# Patient Record
Sex: MALE | Race: WHITE | NOT HISPANIC OR LATINO | Employment: OTHER | URBAN - METROPOLITAN AREA
[De-identification: names, ages, dates, MRNs, and addresses within clinical notes are randomized per-mention and may not be internally consistent; named-entity substitution may affect disease eponyms.]

---

## 2020-09-25 ENCOUNTER — TRANSCRIBE ORDERS (OUTPATIENT)
Dept: SCHEDULING | Facility: REHABILITATION | Age: 60
End: 2020-09-25

## 2020-09-25 DIAGNOSIS — G12.21 ALS (AMYOTROPHIC LATERAL SCLEROSIS) (CMS/HCC): Primary | ICD-10-CM

## 2020-11-10 ENCOUNTER — HOSPITAL ENCOUNTER (OUTPATIENT)
Dept: PHYSICAL THERAPY | Facility: REHABILITATION | Age: 60
Setting detail: THERAPIES SERIES
Discharge: HOME | End: 2020-11-10
Attending: PSYCHIATRY & NEUROLOGY
Payer: MEDICARE

## 2020-11-10 DIAGNOSIS — G12.21 ALS (AMYOTROPHIC LATERAL SCLEROSIS) (CMS/HCC): Primary | ICD-10-CM

## 2020-11-10 PROCEDURE — 97542 WHEELCHAIR MNGMENT TRAINING: CPT | Mod: GP

## 2020-11-10 PROCEDURE — 97163 PT EVAL HIGH COMPLEX 45 MIN: CPT

## 2020-11-10 RX ORDER — BACLOFEN 10 MG/1
20 TABLET ORAL 2 TIMES DAILY
COMMUNITY
Start: 2020-10-31

## 2020-11-10 RX ORDER — FINASTERIDE 5 MG/1
5 TABLET, FILM COATED ORAL
COMMUNITY
Start: 2020-08-28

## 2020-11-10 RX ORDER — RILUZOLE 50 MG/1
50 TABLET, FILM COATED ORAL
COMMUNITY
Start: 2020-09-22

## 2020-11-10 RX ORDER — ROSUVASTATIN CALCIUM 10 MG/1
10 TABLET, COATED ORAL DAILY
COMMUNITY

## 2020-11-10 RX ORDER — NAPROXEN SODIUM 220 MG/1
81 TABLET, FILM COATED ORAL DAILY
COMMUNITY

## 2020-11-10 RX ORDER — PHENYTOIN SODIUM 100 MG/1
200 CAPSULE, EXTENDED RELEASE ORAL NIGHTLY
COMMUNITY
Start: 2020-09-24

## 2020-11-10 RX ORDER — IBUPROFEN 800 MG/1
TABLET ORAL
COMMUNITY
Start: 2020-11-09

## 2020-11-10 RX ORDER — LORAZEPAM 0.5 MG/1
0.5 TABLET ORAL EVERY 6 HOURS PRN
COMMUNITY
Start: 2020-09-13

## 2020-11-10 RX ORDER — CARVEDILOL 12.5 MG/1
12.5 TABLET ORAL
COMMUNITY
Start: 2020-09-10

## 2020-11-10 RX ORDER — CYCLOBENZAPRINE HCL 10 MG
TABLET ORAL NIGHTLY
COMMUNITY
Start: 2020-10-31

## 2020-11-10 RX ORDER — TICAGRELOR 90 MG/1
90 TABLET ORAL
COMMUNITY
Start: 2020-08-31

## 2020-11-10 RX ORDER — TRAMADOL HYDROCHLORIDE 50 MG/1
50 TABLET ORAL EVERY 6 HOURS PRN
COMMUNITY
Start: 2020-08-11

## 2020-11-10 RX ORDER — METFORMIN HYDROCHLORIDE 500 MG/1
TABLET, EXTENDED RELEASE ORAL
COMMUNITY
Start: 2020-10-31

## 2020-11-10 NOTE — Clinical Note
"[address] [address]  [phone]      Dear DR. Woods,      Thank you for this referral. Please review the attached notes and plan of care for your approval.  Please contact our department with any questions.     Sincerely,     Ariana Mchugh, PT      Referring Provider: By co-signing this Plan of Care (POC), you agree with the planned services and interventions recommended by the therapist.       NAME: __________________________________ DATE: ___________________        [employer] Fax: [phone]      [facility]   PT/OT EVALUATION FOR MOBILITY-ASSISTIVE DEVICE    Medicare Provider No. : [id number]                               Patient: Manuel Vásquez      MRN: [id number]  : 1960      Referring Physician: Ishmael Woods*  Insurance Company: MEDICARE  Start of Care/Evaluation Date: 11/10/20   Certification Dates: 11/10/20 through 21    Diagnosis:   1. ALS (amyotrophic lateral sclerosis) (CMS/[facility])        Treatment Diagnosis: Gait Dysfunction    Long Term Goals Time Frame Result Comment/Progress   Evaluate mobility and need for wheelchair 24 weeks       Access to Mobility-related ADL’s within the home 24 weeks        Patient goal: \"I want to be able to move myself around.\"     _X__The patient and caregiver were involved in the goal-setting and are in agreement with the proposed plan.       ASSESSMENT:  Manuel Vásquez is a 60 y.o. male with a diagnosis of Amyotrophic lateral sclerosis (ALS) who does not walk and requires a power wheelchair with an alternative drive control for his mobility and access to mobility related ADLs within his home.     Frequency and duration of treatment: 11/10/20,  10:45 AM- 12:00 PM    PLAN OF CARE  Second visit with with DME representative for equipment trials and to develop wheelchair recommendations  Pursue documentation and funding for new equipment  Follow up at [facility] once new equipment has " arrived    Planned services to include: Wheelchair Management and Training (99965), Assistive Tech Assessment (30320), Therapeutic Activities (14787)    LEARNING ASSESSMENT    Assessment completed: Yes    Learner name:  Manuel    Relationship: Patient    Learning Barriers:  Learning barriers: No Barriers    Preferred Language: English     Needed: No    Learning New Concepts: Listening, Reading, Demonstration and Pictures/Video      CO-LEARNER ASSESSMENT:    Completed: Yes:   Co-Learner name:  Pankaj    Relationship: Family    Learning Barriers:  Learning barriers: No Barriers    Preferred Language: English     Needed: No    Learning New Concepts: Listening, Reading, Demonstration and Pictures/Video      Medical history  Initial symptoms of left hand weakness, underwent an ulnar nerve transposition with no improvement.  Imaging identified a cervical disc herniation for which he underwent C6-T1 laminectomy and fusion with no resolve of weakness.  Manuel was diagnosed with ALS in January 2018.  He suffered an MI a year ago due to a clot and had a stent placed. Past medical history is significant for an S4 sacral fracture and L1 compression fracture due to a fall a year ago.       Height: 180.34  cm  Weight: 77.2  Kg (170 lbs; he has lost approximately 40 lbs since diagnosis)    Social Jackson lives with his wife in a 2 story home with a basement.  The basement has been renovated to include an accessible bathroom with a roll in shower.  There is elevator access to the basement and first floor.  There is a stair lift to the second floor bedroom/bathroom.  He has 4 children that live nearby and a strong support system.      Neuromuscular Status     Cervical strength:  Weakness in cervical extension, loses head control in weight bearing and end of day due to muscular fatigue, strength is good for R/L rotation with posterior support in sitting     Upper extremity PROM Right Left   Shoulder flex  90  degrees 90 degrees   Shoulder abd  90 degrees with pain 90 degrees with pain   Elbow flex Limited due to joint tightness Limited due to joint tightness   Elbow ext WNL WNL   Wrist flex WNL WNL   Wrist ext Limited Limited   Fingers Extension contractures Extension contractures     Lower extremity PROM Right Left   Hip flex WFL WFL   Hip abd WFL WFL   Knee ext WFL WFL   Knee flex WFL WFL   Ankle DF Limited Limited   Ankle PF WFL WFL     Upper extremity strength Right Left   Shoulder flex  0/5 0/5   Shoulder abd  0/5 0/5   Elbow flex 1/5 0/5   Elbow ext 2/5 2/5   Wrist flex 2-/5 2-/5   Wrist ext 2/5 2/5   Fingers 3,4,5 digit movement 5th digit movement only     Lower extremity strength Right Left   Hip flex 3/5 3/5   Hip abd 3/5 3/5   Knee ext 3/5 3/5   Knee flex 3/5 3/5   Ankle DF 1/5 2-/5   Ankle PF 2+/5 2+/5      Posture:  Posterior pelvic tilt, diminished lumbar lordosis, thoracic kyphosis, rounded shoulders with 1+ finger breadth anterior and inferior shoulder subluxation bilaterally, UEs hang dependently, rounded shoulders and head in mild cervical flexion  Tone:  fasciculations in B/L quads; clonus present BLE  Sensation: Grossly intact  Skin Integrity: Grossly intact  Coordination: Absent in BUE due to progressive weakness due to ALS  Trunk Control: Fair in sitting; poor in standing/weight bearing    Pain  No report of pain presently.  Does report frequent low back pain with right SI joint pain which radiates down his right buttock to his posterior thigh.  This pain improves with repositioning and stretching    Activities of Daily Living (ADLs) Level of [place]   Toileting Dependent; uses a urinal at night for bladder management and a bidet for hygiene following bowel movement   Bathing Dependent seated on shower chair   Dressing Dependent in standing with caregiver assistance   Grooming Dependent   Feeding Dependent; some issues with swallow, eats slowly, small sips through a straw.  Frequent laryngospasm  which he is able to work through   Transfers Dependent stand pivot sit with physical stabilization for balance   Bed Mobility Dependent to roll L; MAX A to roll R   Ambulation Pt does not walk due to progressive weakness due to his ALS   Wheelchair mobility Dependent in standard wheelchair         Current Wheelchair  Drive standard wheelchair which the family self paid for.  Upholstery seating which worsens his pain and postural asymmetries.  Dependent in this wheelchair for mobility.    Due to his progressive weakness due to his ALS, Manuel has a mobility deficit that cannot be remediated with a cane or walker as he cannot stand or walk.  Manuel is unable to utilize an optimally configured manual wheelchair due to absent UE volitional control and impaired trunk control due to his ALS.  Manuel would be unable to transfer on or off a scooter seat, the seat would not sufficiently support the postural asymmetries of the trunk, and would be unable to drive effectively with a tiller control due to absent use of his hands and progressive weakness in his BUE due to his ALS.  Manuel requires a power wheelchair with power seat functions to move around within the home safely and independently change position for skin pressure management and repositioning/comfort. He requires a power wheelchair to access the bedroom for sleeping, the bathroom for toileting and bathing, and the kitchen and eating area for meals. Manuel lacks the volitional strength and motor control to reliably use the standard inline joystick configuration due to his progressive weakness from his ALS.  Manuel and his son Cristian were educated regarding alternative drive control options including a leg switch for forward drive control with use of his active abduction of his LE and R/L head rotation with use of proximity switches of a head array for directional control.  Manuel will have the ability to trial this set-up next visit.  Verbal instrcution and visual  demonstration of the power seat functions and the benefits associated with each.     PLAN:  F/u 11/18/2020 to trial head array/leg switch alternative drive control set-up on [employer] M3 PWC with [employer].  Complete measurements and specs for new PWC with full LMN to follow for physician signature.

## 2020-11-10 NOTE — PROGRESS NOTES
"  Referring Provider: By co-signing this Plan of Care (POC), you agree with the planned services and interventions recommended by the therapist.       NAME: __________________________________ DATE: ___________________        [employer] Fax: [phone]      [facility]   PT/OT EVALUATION FOR MOBILITY-ASSISTIVE DEVICE    Medicare Provider No. : [id number]                               Patient: Manuel Vásquez      MRN: [id number]  : 1960      Referring Physician: Ishmael Woods*  Insurance Company: MEDICARE  Start of Care/Evaluation Date: 11/10/20   Certification Dates: 11/10/20 through 21    Diagnosis:   1. ALS (amyotrophic lateral sclerosis) (CMS/[facility])        Treatment Diagnosis: Gait Dysfunction    Long Term Goals Time Frame Result Comment/Progress   Evaluate mobility and need for wheelchair 24 weeks       Access to Mobility-related ADL’s within the home 24 weeks        Patient goal: \"I want to be able to move myself around.\"     _X__The patient and caregiver were involved in the goal-setting and are in agreement with the proposed plan.       ASSESSMENT:  Manuel Vásquez is a 60 y.o. male with a diagnosis of Amyotrophic lateral sclerosis (ALS) who does not walk and requires a power wheelchair with an alternative drive control for his mobility and access to mobility related ADLs within his home.     Frequency and duration of treatment: 11/10/20,  10:45 AM- 12:00 PM    PLAN OF CARE  Second visit with with DME representative for equipment trials and to develop wheelchair recommendations  Pursue documentation and funding for new equipment  Follow up at [facility] once new equipment has arrived    Planned services to include: Wheelchair Management and Training (38346), Assistive Tech Assessment (73047), Therapeutic Activities (21779)    LEARNING ASSESSMENT    Assessment completed: Yes    Learner name:  Manuel    Relationship: Patient    Learning Barriers:  Learning " barriers: No Barriers    Preferred Language: English     Needed: No    Learning New Concepts: Listening, Reading, Demonstration and Pictures/Video      CO-LEARNER ASSESSMENT:    Completed: Yes:   Co-Learner name:  Pankaj    Relationship: Family    Learning Barriers:  Learning barriers: No Barriers    Preferred Language: English     Needed: No    Learning New Concepts: Listening, Reading, Demonstration and Pictures/Video      Medical history  Initial symptoms of left hand weakness, underwent an ulnar nerve transposition with no improvement.  Imaging identified a cervical disc herniation for which he underwent C6-T1 laminectomy and fusion with no resolve of weakness.  Manuel was diagnosed with ALS in January 2018.  He suffered an MI a year ago due to a clot and had a stent placed. Past medical history is significant for an S4 sacral fracture and L1 compression fracture due to a fall a year ago.       Height: 180.34  cm  Weight: 77.2  Kg (170 lbs; he has lost approximately 40 lbs since diagnosis)    Social  Manuel lives with his wife in a 2 story home with a basement.  The basement has been renovated to include an accessible bathroom with a roll in shower.  There is elevator access to the basement and first floor.  There is a stair lift to the second floor bedroom/bathroom.  He has 4 children that live nearby and a strong support system.      Neuromuscular Status     Cervical strength:  Weakness in cervical extension, loses head control in weight bearing and end of day due to muscular fatigue, strength is good for R/L rotation with posterior support in sitting     Upper extremity PROM Right Left   Shoulder flex  90 degrees 90 degrees   Shoulder abd  90 degrees with pain 90 degrees with pain   Elbow flex Limited due to joint tightness Limited due to joint tightness   Elbow ext WNL WNL   Wrist flex WNL WNL   Wrist ext Limited Limited   Fingers Extension contractures Extension contractures     Lower  extremity PROM Right Left   Hip flex WFL WFL   Hip abd WFL WFL   Knee ext WFL WFL   Knee flex WFL WFL   Ankle DF Limited Limited   Ankle PF WFL WFL     Upper extremity strength Right Left   Shoulder flex  0/5 0/5   Shoulder abd  0/5 0/5   Elbow flex 1/5 0/5   Elbow ext 2/5 2/5   Wrist flex 2-/5 2-/5   Wrist ext 2/5 2/5   Fingers 3,4,5 digit movement 5th digit movement only     Lower extremity strength Right Left   Hip flex 3/5 3/5   Hip abd 3/5 3/5   Knee ext 3/5 3/5   Knee flex 3/5 3/5   Ankle DF 1/5 2-/5   Ankle PF 2+/5 2+/5      Posture:  Posterior pelvic tilt, diminished lumbar lordosis, thoracic kyphosis, rounded shoulders with 1+ finger breadth anterior and inferior shoulder subluxation bilaterally, UEs hang dependently, rounded shoulders and head in mild cervical flexion  Tone:  fasciculations in B/L quads; clonus present BLE  Sensation: Grossly intact  Skin Integrity: Grossly intact  Coordination: Absent in BUE due to progressive weakness due to ALS  Trunk Control: Fair in sitting; poor in standing/weight bearing    Pain  No report of pain presently.  Does report frequent low back pain with right SI joint pain which radiates down his right buttock to his posterior thigh.  This pain improves with repositioning and stretching    Activities of Daily Living (ADLs) Level of [place]   Toileting Dependent; uses a urinal at night for bladder management and a bidet for hygiene following bowel movement   Bathing Dependent seated on shower chair   Dressing Dependent in standing with caregiver assistance   Grooming Dependent   Feeding Dependent; some issues with swallow, eats slowly, small sips through a straw.  Frequent laryngospasm which he is able to work through   Transfers Dependent stand pivot sit with physical stabilization for balance   Bed Mobility Dependent to roll L; MAX A to roll R   Ambulation Pt does not walk due to progressive weakness due to his ALS   Wheelchair mobility Dependent in standard  wheelchair         Current Wheelchair  Drive standard wheelchair which the family self paid for.  Upholstery seating which worsens his pain and postural asymmetries.  Dependent in this wheelchair for mobility.    Due to his progressive weakness due to his ALS, Manuel has a mobility deficit that cannot be remediated with a cane or walker as he cannot stand or walk.  Manuel is unable to utilize an optimally configured manual wheelchair due to absent UE volitional control and impaired trunk control due to his ALS.  Manuel would be unable to transfer on or off a scooter seat, the seat would not sufficiently support the postural asymmetries of the trunk, and would be unable to drive effectively with a tiller control due to absent use of his hands and progressive weakness in his BUE due to his ALS.  Manuel requires a power wheelchair with power seat functions to move around within the home safely and independently change position for skin pressure management and repositioning/comfort. He requires a power wheelchair to access the bedroom for sleeping, the bathroom for toileting and bathing, and the kitchen and eating area for meals. Manuel lacks the volitional strength and motor control to reliably use the standard inline joystick configuration due to his progressive weakness from his ALS.  Manuel and his son Cristian were educated regarding alternative drive control options including a leg switch for forward drive control with use of his active abduction of his LE and R/L head rotation with use of proximity switches of a head array for directional control.  Manuel will have the ability to trial this set-up next visit.  Verbal instrcution and visual demonstration of the power seat functions and the benefits associated with each.     PLAN:  F/u 11/18/2020 to trial head array/leg switch alternative drive control set-up on [address] with [employer].  Complete measurements and specs for new [facility] with full LMN to follow for  physician signature.

## 2020-11-10 NOTE — OP PT TREATMENT LOG
"[facility]   PT/OT EVALUATION FOR MOBILITY-ASSISTIVE DEVICE    Medicare Provider No. : [id number]                               Patient: Manuel Vásquez      MRN: [id number]  : 1960      Referring Physician: Ishmael Woods*  Insurance Company: MEDICARE  Start of Care/Evaluation Date: 11/10/20   Certification Dates: 11/10/20 through 21    Diagnosis:   1. ALS (amyotrophic lateral sclerosis) (CMS/[facility])        Treatment Diagnosis: Gait Dysfunction    Long Term Goals Time Frame Result Comment/Progress   Evaluate mobility and need for wheelchair 24 weeks       Access to Mobility-related ADL’s within the home 24 weeks        Patient goal: \"I want to be able to move myself around.\"     _X__The patient and caregiver were involved in the goal-setting and are in agreement with the proposed plan.       ASSESSMENT:  Manuel Vásquez is a 60 y.o. male with a diagnosis of Amyotrophic lateral sclerosis (ALS) who does not walk and requires a power wheelchair with an alternative drive control for his mobility and access to mobility related ADLs within his home.     Frequency and duration of treatment: 11/10/20,  10:45 AM- 12:00 PM    PLAN OF CARE  Second visit with with DME representative for equipment trials and to develop wheelchair recommendations  Pursue documentation and funding for new equipment  Follow up at [facility] once new equipment has arrived    Planned services to include: Wheelchair Management and Training (79254), Assistive Tech Assessment (96695), Therapeutic Activities (92349)    LEARNING ASSESSMENT    Assessment completed: Yes    Learner name:  Manuel    Relationship: Patient    Learning Barriers:  Learning barriers: No Barriers    Preferred Language: English     Needed: No    Learning New Concepts: Listening, Reading, Demonstration and Pictures/Video      CO-LEARNER ASSESSMENT:    Completed: Yes:   Co-Learner name:  Pankaj    Relationship: Family    Learning Barriers: "  Learning barriers: No Barriers    Preferred Language: English     Needed: No    Learning New Concepts: Listening, Reading, Demonstration and Pictures/Video      Medical history  Initial symptoms of left hand weakness, underwent an ulnar nerve transposition with no improvement.  Imaging identified a cervical disc herniation for which he underwent C6-T1 laminectomy and fusion with no resolve of weakness.  Manuel was diagnosed with ALS in January 2018.  He suffered an MI a year ago due to a clot and had a stent placed. Past medical history is significant for an S4 sacral fracture and L1 compression fracture due to a fall a year ago.       Height: 180.34  cm  Weight: 77.2  Kg (170 lbs; he has lost approximately 40 lbs since diagnosis)    Social Jackson lives with his wife in a 2 story home with a basement.  The basement has been renovated to include an accessible bathroom with a roll in shower.  There is elevator access to the basement and first floor.  There is a stair lift to the second floor bedroom/bathroom.  He has 4 children that live nearby and a strong support system.      Neuromuscular Status     Cervical strength:  Weakness in cervical extension, loses head control in weight bearing and end of day due to muscular fatigue, strength is good for R/L rotation with posterior support in sitting     Upper extremity PROM Right Left   Shoulder flex  90 degrees 90 degrees   Shoulder abd  90 degrees with pain 90 degrees with pain   Elbow flex Limited due to joint tightness Limited due to joint tightness   Elbow ext WNL WNL   Wrist flex WNL WNL   Wrist ext Limited Limited   Fingers Extension contractures Extension contractures     Lower extremity PROM Right Left   Hip flex WFL WFL   Hip abd WFL WFL   Knee ext WFL WFL   Knee flex WFL WFL   Ankle DF Limited Limited   Ankle PF WFL WFL     Upper extremity strength Right Left   Shoulder flex  0/5 0/5   Shoulder abd  0/5 0/5   Elbow flex 1/5 0/5   Elbow ext 2/5 2/5    Wrist flex 2-/5 2-/5   Wrist ext 2/5 2/5   Fingers 3,4,5 digit movement 5th digit movement only     Lower extremity strength Right Left   Hip flex 3/5 3/5   Hip abd 3/5 3/5   Knee ext 3/5 3/5   Knee flex 3/5 3/5   Ankle DF 1/5 2-/5   Ankle PF 2+/5 2+/5      Posture:  Posterior pelvic tilt, diminished lumbar lordosis, thoracic kyphosis, rounded shoulders with 1+ finger breadth anterior and inferior shoulder subluxation bilaterally, UEs hang dependently, rounded shoulders and head in mild cervical flexion  Tone:  fasciculations in B/L quads; clonus present BLE  Sensation: Grossly intact  Skin Integrity: Grossly intact  Coordination: Absent in BUE due to progressive weakness due to ALS  Trunk Control: Fair in sitting; poor in standing/weight bearing    Pain  No report of pain presently.  Does report frequent low back pain with right SI joint pain which radiates down his right buttock to his posterior thigh.  This pain improves with repositioning and stretching    Activities of Daily Living (ADLs) Level of [place]   Toileting Dependent; uses a urinal at night for bladder management and a bidet for hygiene following bowel movement   Bathing Dependent seated on shower chair   Dressing Dependent in standing with caregiver assistance   Grooming Dependent   Feeding Dependent; some issues with swallow, eats slowly, small sips through a straw.  Frequent laryngospasm which he is able to work through   Transfers Dependent stand pivot sit with physical stabilization for balance   Bed Mobility Dependent to roll L; MAX A to roll R   Ambulation Pt does not walk due to progressive weakness due to his ALS   Wheelchair mobility Dependent in standard wheelchair         Current Wheelchair  Drive standard wheelchair which the family self paid for.  Upholstery seating which worsens his pain and postural asymmetries.  Dependent in this wheelchair for mobility.    Due to his progressive weakness due to his ALS, Manuel has a mobility  deficit that cannot be remediated with a cane or walker as he cannot stand or walk.  Manuel is unable to utilize an optimally configured manual wheelchair due to absent UE volitional control and impaired trunk control due to his ALS.  Manuel would be unable to transfer on or off a scooter seat, the seat would not sufficiently support the postural asymmetries of the trunk, and would be unable to drive effectively with a tiller control due to absent use of his hands and progressive weakness in his BUE due to his ALS.  Manuel requires a power wheelchair with power seat functions to move around within the home safely and independently change position for skin pressure management and repositioning/comfort. He requires a power wheelchair to access the bedroom for sleeping, the bathroom for toileting and bathing, and the kitchen and eating area for meals. Manuel lacks the volitional strength and motor control to reliably use the standard inline joystick configuration due to his progressive weakness from his ALS.  Manuel and his son Cristian were educated regarding alternative drive control options including a leg switch for forward drive control with use of his active abduction of his LE and R/L head rotation with use of proximity switches of a head array for directional control.  Manuel will have the ability to trial this set-up next visit.  Verbal instrcution and visual demonstration of the power seat functions and the benefits associated with each.     PLAN:  F/u 11/18/2020 to trial head array/leg switch alternative drive control set-up on [address] with [employer].  Complete measurements and specs for new PW with full LMN to follow for physician signature.

## 2020-11-18 ENCOUNTER — HOSPITAL ENCOUNTER (OUTPATIENT)
Dept: PHYSICAL THERAPY | Facility: REHABILITATION | Age: 60
Setting detail: THERAPIES SERIES
Discharge: HOME | End: 2020-11-18
Attending: PSYCHIATRY & NEUROLOGY
Payer: MEDICARE

## 2020-11-18 DIAGNOSIS — G12.21 ALS (AMYOTROPHIC LATERAL SCLEROSIS) (CMS/HCC): Primary | ICD-10-CM

## 2020-11-18 PROCEDURE — 97530 THERAPEUTIC ACTIVITIES: CPT | Mod: GP

## 2020-11-18 PROCEDURE — 97542 WHEELCHAIR MNGMENT TRAINING: CPT | Mod: GP,59

## 2020-11-18 NOTE — PROGRESS NOTES
PT DAILY NOTE FOR OUTPATIENT THERAPY    Patient: Manuel Vásquez   MRN: [id number]  : 1960 60 y.o.  Referring Physician: Ishmael Woods*  Date of Visit: 2020    Certification Dates: 11/10/20 through 21    Diagnosis:   1. ALS (amyotrophic lateral sclerosis) (CMS/[facility])        Chief Complaints:       Precautions:   Existing Precautions/Restrictions: fall     TODAY'S VISIT    History/Vitals/Pain/Encounter Info - 20 1047        Injury History/Precautions/Daily Required Info    Referring Physician  Dr. Woods     Existing Precautions/Restrictions  fall     Pertinent History of Current Functional Problem  ALS diagnosed in 2018     OP Specialty  Wheelchair     Document Type  daily treatment     Patient/Family/Caregiver Comments/Observations  El is here today for a follow-up for trial of a PWC with alternative drive     Start Time  1030     Stop Time  1200     Time Calculation (min)  90 min     Patient reported fall since last visit  No        Pain Assessment    Currently in pain  No/Denies        Pain Intervention    Intervention   no report of pain     Post Intervention Comments  intermittent back discomfort with movement        Pre Activity Vital Signs    Oxygen Therapy  None (Room air)         Daily Treatment Assessment and Plan - 20 1141        Daily Treatment Assessment and Plan    Progress toward goals  Progressing     Daily Outcome Summary  Measurements and specs were completed for a Permobil M3 PWC with alternative drive control and Roho Quadtro seat cushion.     Plan and Recommendations  Full LMN to follow for physician signature.  Return for fitting and delivery at [facility].             Today's Treatment:      Manuel was seen today for a trial of a head array and thigh switch alternative drive control on a Permobil M3.  Manuel was seen in conjunction with KATE Dinero of [facility].  He was assisted to transfer into the trial [facility] and following  verbal instruction/visual demo he demonstrated the ability to safely drive over level indoor surfaces and up/down an ADA ramp MOD I.  Measurements and specs were completed for a Permobil M3 PWC with alternative drive control and Roho Quadtro seat cushion.  Full LMN to follow for physician signature.

## 2020-11-18 NOTE — OP PT TREATMENT LOG
Manuel was seen today for a trial of a head array and thigh switch alternative drive control on a Permobil M3.  Manuel was seen in conjunction with KATE Dinero of [facility].  He was assisted to transfer into the trial powerwheelchair and following verbal instruction/visual demo he demonstrated the ability to safely drive over level indoor surfaces and up/down an ADA ramp MOD I.  Measurements and specs were completed for a Permobil M3 PWC with alternative drive control and Roho Quadtro seat cushion.  Full LMN to follow for physician signature.

## 2020-12-30 ENCOUNTER — HOSPITAL ENCOUNTER (OUTPATIENT)
Dept: PHYSICAL THERAPY | Facility: REHABILITATION | Age: 60
Setting detail: THERAPIES SERIES
Discharge: HOME | End: 2020-12-30
Attending: PSYCHIATRY & NEUROLOGY
Payer: MEDICARE

## 2020-12-30 DIAGNOSIS — G12.21 ALS (AMYOTROPHIC LATERAL SCLEROSIS) (CMS/HCC): Primary | ICD-10-CM

## 2020-12-30 PROBLEM — R25.3 FASCICULATIONS: Status: ACTIVE | Noted: 2017-07-24

## 2020-12-30 PROBLEM — M47.27 OSTEOARTHRITIS OF SPINE WITH RADICULOPATHY, LUMBOSACRAL REGION: Status: ACTIVE | Noted: 2018-01-24

## 2020-12-30 PROBLEM — G95.9 MYELOPATHY (CMS/HCC): Status: ACTIVE | Noted: 2017-12-28

## 2020-12-30 PROBLEM — J96.10: Status: ACTIVE | Noted: 2020-01-07

## 2020-12-30 PROBLEM — I21.9 MYOCARDIAL INFARCT (CMS/HCC): Status: ACTIVE | Noted: 2020-12-30

## 2020-12-30 PROBLEM — E55.9 VITAMIN D DEFICIENCY: Status: ACTIVE | Noted: 2020-12-30

## 2020-12-30 PROBLEM — E78.5 DYSLIPIDEMIA: Status: ACTIVE | Noted: 2020-12-30

## 2020-12-30 PROBLEM — R76.8 CMV (CYTOMEGALOVIRUS) ANTIBODY POSITIVE: Status: ACTIVE | Noted: 2018-08-07

## 2020-12-30 PROBLEM — I25.10 CORONARY ARTERY DISEASE: Status: ACTIVE | Noted: 2020-12-30

## 2020-12-30 PROBLEM — M51.27 LUMBOSACRAL DISC HERNIATION: Status: ACTIVE | Noted: 2020-12-30

## 2020-12-30 PROBLEM — R73.02 IMPAIRED GLUCOSE TOLERANCE: Status: ACTIVE | Noted: 2017-12-14

## 2020-12-30 PROBLEM — R29.2 HYPERREFLEXIA: Status: ACTIVE | Noted: 2017-10-26

## 2020-12-30 PROBLEM — E78.00 PURE HYPERCHOLESTEROLEMIA: Status: ACTIVE | Noted: 2017-12-14

## 2020-12-30 PROBLEM — I10 ESSENTIAL HYPERTENSION: Status: ACTIVE | Noted: 2020-12-30

## 2020-12-30 PROBLEM — M62.81 MUSCLE WEAKNESS (GENERALIZED): Status: ACTIVE | Noted: 2018-01-24

## 2020-12-30 PROBLEM — M47.812 CERVICAL SPONDYLOSIS WITHOUT MYELOPATHY: Status: ACTIVE | Noted: 2017-08-04

## 2020-12-30 PROBLEM — I05.9 MITRAL VALVE DISORDER: Status: ACTIVE | Noted: 2020-12-30

## 2020-12-30 PROBLEM — E78.5 HYPERLIPIDEMIA, UNSPECIFIED: Status: ACTIVE | Noted: 2020-12-30

## 2020-12-30 PROCEDURE — 97542 WHEELCHAIR MNGMENT TRAINING: CPT | Mod: GP,59

## 2020-12-30 PROCEDURE — 97530 THERAPEUTIC ACTIVITIES: CPT | Mod: GP

## 2020-12-30 NOTE — LETTER
[address] [address]  [phone]  [employer] Fax: [phone]      PT PROGRESS NOTE FOR OUTPATIENT THERAPY    Patient: Manuel Vásquez   MRN: [id number]  : 1960 60 y.o.  Referring Physician: Ishmael Woods MD  Date of Visit: 2020      Certification Dates: 11/10/20 through 21    Recommended Frequency & Duration:  Other for up to 6 months     Diagnosis:   1. ALS (amyotrophic lateral sclerosis) (CMS/[facility])        Chief Complaints:  Chief Complaint   Patient presents with   • Dec ROM   • Balance Deficits   • Immobility   • Decreased Trunk Control       Precautions:   Existing Precautions/Restrictions: fall     TODAY'S VISIT:      Pain/Vitals - 20 1445        Pain Assessment    Currently in pain  No/Denies        Pre Activity Vital Signs    Oxygen Therapy  None (Room air)        Pain Intervention    Intervention   fitting and delivery of new PWC and seating system     Post Intervention Comments  comfort in seating system          Daily Falls Screen - 20 1517        Daily Falls Assessment    Patient reported fall since last visit  --             Today's Treatment::      DELIVERY NOTE for ASSISTIVE TECHNOLOGY    Long Term Goals Time Frame Result Comment/Progress   Independent mobility in new Permobil M3 power wheelchair with alternative drive head array and leg switch control 24 weeks MET      Good postural support in new seating system 24 weeks MET    Good skin pressure management in new seating system 24 weeks MET        Serial number of new wheelchair:    Intervention and results:Manuel Vásquez is a 60 y.o. male who was seen today for fitting and delivery of the new Permobil M3 power wheelchair with alternative drive head array control with leg switch, delivered as ordered by KATE Dinero of [facility] .  Manuel Vásquez was assisted to transfer into the new wheelchair and adjustments were made to optimize posture and alignment.  Adjustments included  position/angle of the armrests, position of the thoracic lateral supports, position of the thigh guides and headrest back pad placement.  The B/L leg switches were adjusted for intentional access but not accidental activation.  The right and left head array pads were properly adjusted.  Following adjustments, verbal instruction and visual demonstration of use of the attendant control with Manuel's son, Cristian, who was present and demonstrated independence with use.  Verbal instruction and visual demonstration of functioning of mode/menu selection via the left leg switch and activation through the right head pad.  Review of forward drive activation through the right leg switch and directional control via the head array pads.  Manuel's head control and cervical strength is declining and therefore he will benefit from a suboccipital support for improved stabilization of his head and neck to improve his accuracy with driving and minimize fatigue of his cervical muscles throughout the day in the PWC.  Following education and review, Manuel drove and controlled the power seat functions with min verbal cues safely and independently and was able to drive around the hospital including in straight path and around obstacles safely and accurately.  Abner will review chair usage with Manuel and his caregivers again when the PWC is delivered to Manuel's home on Monday, 01/04/21.      RECOMMENDATIONS  1.  Suboccipital support pad with mounting hardware to provide proximal support of Manuel's head and neck for improved distal control of right and left driving via cervical rotation with the head array system due to progressive weakness with diminished head control due to his ALS    2. 2 each, [employer] headrest additional hardware links to ensure adequate adjustability in the headrest hardware to properly support Manuel's head in the PWC    Education provided in these areas:  Management of drive and power seat functions  through the alternative drive input  Weight shifting  Care of new wheelchair   Skin pressure management  How to get service for mobility device  Use of transit tie-downs  Charging batteries   Use of the tray             Plan: No further follow-up needed at this time, suboccipital pad can be delivered to home by [employer] or Manuel may return to clinic for follow-up. Abner will deliver the new wheelchair to Manuel's home on Monday, 01/04/21.              ____________________________________________________________  Ariana Mchugh, PT, DPT, NCS, [facility]/SMS   Date      I have read these recommendations and plan of care and agree with them        _____________________________________________________       _____________  Ishmael Woods MD                                                                   DATE

## 2020-12-30 NOTE — OP PT TREATMENT LOG
DELIVERY NOTE for ASSISTIVE TECHNOLOGY    Long Term Goals Time Frame Result Comment/Progress   Independent mobility in new Permobil M3 power wheelchair with alternative drive head array and leg switch control 24 weeks MET      Good postural support in new seating system 24 weeks MET    Good skin pressure management in new seating system 24 weeks MET        Serial number of new wheelchair:    Intervention and results:Manuel Vásquez is a 60 y.o. male who was seen today for fitting and delivery of the new Permobil M3 power wheelchair with alternative drive head array control with leg switch, delivered as ordered by KATE Dinero of [facility] .  Manuel Vásquez was assisted to transfer into the new wheelchair and adjustments were made to optimize posture and alignment.  Adjustments included position/angle of the armrests, position of the thoracic lateral supports, position of the thigh guides and headrest back pad placement.  The B/L leg switches were adjusted for intentional access but not accidental activation.  The right and left head array pads were properly adjusted.  Following adjustments, verbal instruction and visual demonstration of use of the attendant control with Manuel's son, Cristian, who was present and demonstrated independence with use.  Verbal instruction and visual demonstration of functioning of mode/menu selection via the left leg switch and activation through the right head pad.  Review of forward drive activation through the right leg switch and directional control via the head array pads.  Manuel's head control and cervical strength is declining and therefore he will benefit from a suboccipital support for improved stabilization of his head and neck to improve his accuracy with driving and minimize fatigue of his cervical muscles throughout the day in the [facility].  Following education and review, Manuel drove and controlled the power seat functions with min verbal cues safely and independently and was  able to drive around the hospital including in straight path and around obstacles safely and accurately.  Abner will review chair usage with Manuel and his caregivers again when the PWC is delivered to Manuel's home on Monday, 01/04/21.      RECOMMENDATIONS  1.  Suboccipital support pad with mounting hardware to provide proximal support of Manuel's head and neck for improved distal control of right and left driving via cervical rotation with the head array system due to progressive weakness with diminished head control due to his ALS    2. 2 each, [employer] headrest additional hardware links to ensure adequate adjustability in the headrest hardware to properly support Manuel's head in the PWC    Education provided in these areas:  Management of drive and power seat functions through the alternative drive input  Weight shifting  Care of new wheelchair   Skin pressure management  How to get service for mobility device  Use of transit tie-downs  Charging batteries   Use of the tray             Plan: No further follow-up needed at this time, suboccipital pad can be delivered to home by [employer] or Manuel may return to clinic for follow-up. [employer] will deliver the new wheelchair to Manuel's home on Monday, 01/04/21.

## 2020-12-30 NOTE — PROGRESS NOTES
PT PROGRESS NOTE FOR OUTPATIENT THERAPY    Patient: Manuel Vásquez   MRN: [id number]  : 1960 60 y.o.  Referring Physician: Ishmael Woods MD  Date of Visit: 2020      Certification Dates: 11/10/20 through 21    Recommended Frequency & Duration:  Other for up to 6 months     Diagnosis:   1. ALS (amyotrophic lateral sclerosis) (CMS/[facility])        Chief Complaints:  Chief Complaint   Patient presents with   • Dec ROM   • Balance Deficits   • Immobility   • Decreased Trunk Control       Precautions:   Existing Precautions/Restrictions: fall     TODAY'S VISIT:      Pain/Vitals - 20 1445        Pain Assessment    Currently in pain  No/Denies        Pre Activity Vital Signs    Oxygen Therapy  None (Room air)        Pain Intervention    Intervention   fitting and delivery of new PWC and seating system     Post Intervention Comments  comfort in seating system          Daily Falls Screen - 20 1517        Daily Falls Assessment    Patient reported fall since last visit  --             Today's Treatment::      DELIVERY NOTE for ASSISTIVE TECHNOLOGY    Long Term Goals Time Frame Result Comment/Progress   Independent mobility in new Permobil M3 power wheelchair with alternative drive head array and leg switch control 24 weeks MET      Good postural support in new seating system 24 weeks MET    Good skin pressure management in new seating system 24 weeks MET        Serial number of new wheelchair:    Intervention and results:Manuel Vásquez is a 60 y.o. male who was seen today for fitting and delivery of the new Permobil M3 power wheelchair with alternative drive head array control with leg switch, delivered as ordered by KTAE Dinero of [facility] .  Manuel Vásquez was assisted to transfer into the new wheelchair and adjustments were made to optimize posture and alignment.  Adjustments included position/angle of the armrests, position of the thoracic lateral supports, position of the  thigh guides and headrest back pad placement.  The B/L leg switches were adjusted for intentional access but not accidental activation.  The right and left head array pads were properly adjusted.  Following adjustments, verbal instruction and visual demonstration of use of the attendant control with Manuel's son, Cristian, who was present and demonstrated independence with use.  Verbal instruction and visual demonstration of functioning of mode/menu selection via the left leg switch and activation through the right head pad.  Review of forward drive activation through the right leg switch and directional control via the head array pads.  Manuel's head control and cervical strength is declining and therefore he will benefit from a suboccipital support for improved stabilization of his head and neck to improve his accuracy with driving and minimize fatigue of his cervical muscles throughout the day in the PWC.  Following education and review, Manuel drove and controlled the power seat functions with min verbal cues safely and independently and was able to drive around the hospital including in straight path and around obstacles safely and accurately.  Abner will review chair usage with Manuel and his caregivers again when the PWC is delivered to Manuel's home on Monday, 01/04/21.      RECOMMENDATIONS  1.  Suboccipital support pad with mounting hardware to provide proximal support of Manuel's head and neck for improved distal control of right and left driving via cervical rotation with the head array system due to progressive weakness with diminished head control due to his ALS    2. 2 each, [employer] headrest additional hardware links to ensure adequate adjustability in the headrest hardware to properly support Manuel's head in the PWC    Education provided in these areas:  Management of drive and power seat functions through the alternative drive input  Weight shifting  Care of new wheelchair   Skin pressure  management  How to get service for mobility device  Use of transit tie-downs  Charging batteries   Use of the tray             Plan: No further follow-up needed at this time, suboccipital pad can be delivered to home by [employer] or Manuel may return to clinic for follow-up. [facility]ner will deliver the new wheelchair to Manuel's home on Monday, 01/04/21.              ____________________________________________________________  Ariana Mchugh, PT, DPT, NCS, [facility]/SMS   Date      I have read these recommendations and plan of care and agree with them        _____________________________________________________       _____________  Ishmael Woods MD                                                                   DATE

## 2020-12-31 NOTE — ADDENDUM NOTE
Encounter addended by: Ariana Mchugh, PT on: 12/31/2020 1:55 PM   Actions taken: Clinical Note Signed, Letter saved

## 2021-01-19 ENCOUNTER — IMMUNIZATIONS (OUTPATIENT)
Dept: FAMILY MEDICINE CLINIC | Facility: HOSPITAL | Age: 61
End: 2021-01-19

## 2021-01-19 DIAGNOSIS — Z23 ENCOUNTER FOR IMMUNIZATION: Primary | ICD-10-CM

## 2021-01-19 PROCEDURE — 91300 SARS-COV-2 / COVID-19 MRNA VACCINE (PFIZER-BIONTECH) 30 MCG: CPT

## 2021-01-19 PROCEDURE — 0001A SARS-COV-2 / COVID-19 MRNA VACCINE (PFIZER-BIONTECH) 30 MCG: CPT

## 2021-02-10 ENCOUNTER — IMMUNIZATIONS (OUTPATIENT)
Dept: FAMILY MEDICINE CLINIC | Facility: HOSPITAL | Age: 61
End: 2021-02-10

## 2021-02-10 DIAGNOSIS — Z23 ENCOUNTER FOR IMMUNIZATION: Primary | ICD-10-CM

## 2021-02-10 PROCEDURE — 0002A SARS-COV-2 / COVID-19 MRNA VACCINE (PFIZER-BIONTECH) 30 MCG: CPT

## 2021-02-10 PROCEDURE — 91300 SARS-COV-2 / COVID-19 MRNA VACCINE (PFIZER-BIONTECH) 30 MCG: CPT
